# Patient Record
Sex: FEMALE | Race: OTHER | HISPANIC OR LATINO | Employment: PART TIME | URBAN - METROPOLITAN AREA
[De-identification: names, ages, dates, MRNs, and addresses within clinical notes are randomized per-mention and may not be internally consistent; named-entity substitution may affect disease eponyms.]

---

## 2020-11-12 ENCOUNTER — NURSE TRIAGE (OUTPATIENT)
Dept: ADMINISTRATIVE | Facility: CLINIC | Age: 55
End: 2020-11-12

## 2020-11-12 NOTE — TELEPHONE ENCOUNTER
OCC Rn Patient calling today, c/o Bleeding from the vagina, started today.  Pad x 3 saturated and dark brown  pads.  Patient is post menopause and now is bleeding.  Dark brown.   Pain is a 8/10 Vagina Pain. And ab pain.   Care advice is to go to ED now or to office with OBGYN approval,   Calling OB for pt, to see if she can see  Today,  Can go to walk in clinic to See Dr. Womack or associate, or need to go to ED//911. If symptoms get worse to call 911.   Patient  Verbalized understanding.        Reason for Disposition   SEVERE abdominal pain (e.g., excruciating)    Additional Information   Negative: Passed out (i.e., fainted, collapsed and was not responding)   Negative: Difficult to awaken or acting confused (e.g., disoriented, slurred speech)   Negative: Shock suspected (e.g., cold/pale/clammy skin, too weak to stand)   Negative: SEVERE bleeding (e.g., continuous red blood from vagina, or large blood clots) and very weak (can't stand)   Negative: Sounds like a life-threatening emergency to the triager   Negative: Pain or burning with passing urine is the main symptom   Negative: Pregnant with vaginal discharge    Protocols used: ST VAGINAL [id number]-IA, ST VAGINAL BLEEDING - [id number]

## 2022-07-05 PROBLEM — N39.0 UTI (URINARY TRACT INFECTION): Status: ACTIVE | Noted: 2022-07-05

## 2022-07-05 PROBLEM — E11.9 DIABETES: Status: ACTIVE | Noted: 2022-07-05

## 2022-07-05 PROBLEM — E78.5 HYPERLIPIDEMIA: Status: ACTIVE | Noted: 2022-07-05

## 2022-07-05 PROBLEM — J45.909 ASTHMA: Status: ACTIVE | Noted: 2022-07-05

## 2022-07-05 PROBLEM — E03.9 HYPOTHYROID: Status: ACTIVE | Noted: 2022-07-05

## 2022-07-05 PROBLEM — Z79.4 TYPE 2 DIABETES MELLITUS, WITH LONG-TERM CURRENT USE OF INSULIN: Status: ACTIVE | Noted: 2022-07-05

## 2022-08-07 PROBLEM — M89.8X8 SKULL MASS: Status: ACTIVE | Noted: 2022-08-07

## 2022-08-07 PROBLEM — I10 PRIMARY HYPERTENSION: Status: ACTIVE | Noted: 2022-08-07

## 2022-08-26 PROBLEM — M43.16 SPONDYLOLISTHESIS OF LUMBAR REGION: Status: ACTIVE | Noted: 2022-08-26

## 2022-11-03 PROBLEM — Z01.810 PRE-OPERATIVE CARDIOVASCULAR EXAMINATION: Status: ACTIVE | Noted: 2022-11-03

## 2022-11-03 PROBLEM — M48.062 SPINAL STENOSIS OF LUMBAR REGION WITH NEUROGENIC CLAUDICATION: Status: ACTIVE | Noted: 2022-11-03

## 2022-11-03 PROBLEM — Z86.73 HISTORY OF TIA (TRANSIENT ISCHEMIC ATTACK): Status: ACTIVE | Noted: 2022-11-03

## 2022-11-03 PROBLEM — Z98.61 CAD S/P PERCUTANEOUS CORONARY ANGIOPLASTY: Status: ACTIVE | Noted: 2022-11-03

## 2022-11-03 PROBLEM — Z01.818 PREOPERATIVE EVALUATION TO RULE OUT SURGICAL CONTRAINDICATION: Status: ACTIVE | Noted: 2022-11-03

## 2022-11-03 PROBLEM — I25.10 CAD S/P PERCUTANEOUS CORONARY ANGIOPLASTY: Status: ACTIVE | Noted: 2022-11-03

## 2022-11-04 PROBLEM — K59.00 CONSTIPATION: Status: ACTIVE | Noted: 2022-11-04

## 2022-11-06 PROBLEM — R50.9 FEVER: Status: ACTIVE | Noted: 2022-11-06

## 2022-12-09 PROBLEM — G06.1 ABSCESS IN EPIDURAL SPACE OF LUMBAR SPINE: Status: ACTIVE | Noted: 2022-12-09

## 2022-12-09 PROBLEM — R15.9 BOWEL AND BLADDER INCONTINENCE: Status: ACTIVE | Noted: 2022-12-09

## 2022-12-09 PROBLEM — D17.79 EPIDURAL LIPOMATOSIS: Status: ACTIVE | Noted: 2022-12-09

## 2022-12-09 PROBLEM — R32 BOWEL AND BLADDER INCONTINENCE: Status: ACTIVE | Noted: 2022-12-09

## 2022-12-09 PROBLEM — E66.01 CLASS 2 SEVERE OBESITY WITH SERIOUS COMORBIDITY AND BODY MASS INDEX (BMI) OF 38.0 TO 38.9 IN ADULT: Status: ACTIVE | Noted: 2022-12-09

## 2022-12-09 PROBLEM — R20.0 SENSORY LOSS: Status: ACTIVE | Noted: 2022-12-09

## 2022-12-09 PROBLEM — M54.50 ACUTE MIDLINE LOW BACK PAIN: Status: ACTIVE | Noted: 2022-12-09

## 2022-12-09 PROBLEM — E11.42 DIABETIC PERIPHERAL NEUROPATHY: Status: ACTIVE | Noted: 2022-12-09

## 2022-12-09 PROBLEM — R29.898 WEAKNESS OF BOTH LOWER EXTREMITIES: Status: ACTIVE | Noted: 2022-12-09

## 2022-12-09 PROBLEM — E66.812 CLASS 2 SEVERE OBESITY WITH SERIOUS COMORBIDITY AND BODY MASS INDEX (BMI) OF 38.0 TO 38.9 IN ADULT: Status: ACTIVE | Noted: 2022-12-09

## 2022-12-09 PROBLEM — L76.34 POSTOPERATIVE SEROMA OF SUBCUTANEOUS TISSUE AFTER NON-DERMATOLOGIC PROCEDURE: Status: ACTIVE | Noted: 2022-12-09

## 2022-12-12 PROBLEM — G06.1 ABSCESS IN EPIDURAL SPACE OF LUMBAR SPINE: Status: RESOLVED | Noted: 2022-12-09 | Resolved: 2022-12-12

## 2022-12-28 PROBLEM — E66.01 CLASS 2 SEVERE OBESITY WITH SERIOUS COMORBIDITY AND BODY MASS INDEX (BMI) OF 38.0 TO 38.9 IN ADULT: Chronic | Status: ACTIVE | Noted: 2022-12-09

## 2022-12-28 PROBLEM — E66.812 CLASS 2 SEVERE OBESITY WITH SERIOUS COMORBIDITY AND BODY MASS INDEX (BMI) OF 38.0 TO 38.9 IN ADULT: Chronic | Status: ACTIVE | Noted: 2022-12-09

## 2022-12-28 PROBLEM — M48.062 SPINAL STENOSIS OF LUMBAR REGION WITH NEUROGENIC CLAUDICATION: Chronic | Status: ACTIVE | Noted: 2022-11-03

## 2022-12-28 PROBLEM — R20.0 SENSORY LOSS: Chronic | Status: ACTIVE | Noted: 2022-12-09

## 2022-12-28 PROBLEM — Z79.4 INSULIN DEPENDENT TYPE 2 DIABETES MELLITUS: Chronic | Status: ACTIVE | Noted: 2022-07-05

## 2022-12-28 PROBLEM — R15.9 BOWEL AND BLADDER INCONTINENCE: Chronic | Status: ACTIVE | Noted: 2022-12-09

## 2022-12-28 PROBLEM — Z78.9 ACTIVITY OF DAILY LIVING ALTERATION: Status: ACTIVE | Noted: 2022-12-28

## 2022-12-28 PROBLEM — I25.10 CAD S/P PERCUTANEOUS CORONARY ANGIOPLASTY: Chronic | Status: ACTIVE | Noted: 2022-11-03

## 2022-12-28 PROBLEM — J45.909 ASTHMA: Chronic | Status: ACTIVE | Noted: 2022-07-05

## 2022-12-28 PROBLEM — R29.898 WEAKNESS OF BOTH LOWER EXTREMITIES: Chronic | Status: ACTIVE | Noted: 2022-12-09

## 2022-12-28 PROBLEM — I10 HYPERTENSION: Chronic | Status: ACTIVE | Noted: 2022-08-07

## 2022-12-28 PROBLEM — D17.79 EPIDURAL LIPOMATOSIS: Chronic | Status: ACTIVE | Noted: 2022-12-09

## 2022-12-28 PROBLEM — Z98.61 CAD S/P PERCUTANEOUS CORONARY ANGIOPLASTY: Chronic | Status: ACTIVE | Noted: 2022-11-03

## 2022-12-28 PROBLEM — E11.42 DIABETIC PERIPHERAL NEUROPATHY: Chronic | Status: ACTIVE | Noted: 2022-12-09

## 2022-12-28 PROBLEM — L76.34 POSTOPERATIVE SEROMA OF SUBCUTANEOUS TISSUE AFTER NON-DERMATOLOGIC PROCEDURE: Chronic | Status: ACTIVE | Noted: 2022-12-09

## 2022-12-28 PROBLEM — Z86.73 HISTORY OF TIA (TRANSIENT ISCHEMIC ATTACK): Chronic | Status: ACTIVE | Noted: 2022-11-03
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
## 2022-12-28 PROBLEM — E78.5 HYPERLIPIDEMIA: Chronic | Status: ACTIVE | Noted: 2022-07-05

## 2022-12-28 PROBLEM — E11.9 INSULIN DEPENDENT TYPE 2 DIABETES MELLITUS: Chronic | Status: ACTIVE | Noted: 2022-07-05

## 2022-12-28 PROBLEM — E03.9 HYPOTHYROIDISM: Chronic | Status: ACTIVE | Noted: 2022-07-05

## 2022-12-28 PROBLEM — M43.16 SPONDYLOLISTHESIS OF LUMBAR REGION: Chronic | Status: ACTIVE | Noted: 2022-08-26

## 2022-12-28 PROBLEM — M89.8X8 SKULL MASS: Chronic | Status: ACTIVE | Noted: 2022-08-07

## 2022-12-28 PROBLEM — R32 BOWEL AND BLADDER INCONTINENCE: Chronic | Status: ACTIVE | Noted: 2022-12-09

## 2023-01-17 PROBLEM — Z98.1 S/P LUMBAR FUSION: Status: ACTIVE | Noted: 2023-01-17

## 2023-03-16 PROBLEM — H25.13 AGE-RELATED NUCLEAR CATARACT OF BOTH EYES: Status: ACTIVE | Noted: 2023-03-16
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
## 2023-05-27 PROBLEM — I10 HYPERTENSION: Status: ACTIVE | Noted: 2023-05-27

## 2023-05-27 PROBLEM — E11.9 DIABETES MELLITUS: Status: ACTIVE | Noted: 2023-05-27

## 2023-05-27 PROBLEM — M54.50 ACUTE MIDLINE LOW BACK PAIN: Status: ACTIVE | Noted: 2023-05-27

## 2023-05-27 PROBLEM — G35 MS (MULTIPLE SCLEROSIS): Status: ACTIVE | Noted: 2023-05-27

## 2023-05-27 PROBLEM — G45.9 TIA (TRANSIENT ISCHEMIC ATTACK): Status: ACTIVE | Noted: 2023-05-27

## 2023-05-28 PROBLEM — E11.9 DIABETES MELLITUS: Chronic | Status: ACTIVE | Noted: 2023-05-27

## 2023-05-28 PROBLEM — R29.90 NEUROSENSORY DEFICIT: Status: ACTIVE | Noted: 2023-05-28

## 2023-05-28 PROBLEM — I10 HYPERTENSION: Chronic | Status: ACTIVE | Noted: 2023-05-27

## 2023-05-28 PROBLEM — G35 MS (MULTIPLE SCLEROSIS): Chronic | Status: ACTIVE | Noted: 2023-05-27

## 2023-05-28 PROBLEM — E03.8 OTHER SPECIFIED HYPOTHYROIDISM: Status: ACTIVE | Noted: 2023-05-28

## 2023-05-30 PROBLEM — R29.898 BILATERAL LEG WEAKNESS: Status: ACTIVE | Noted: 2023-05-30

## 2023-08-28 PROBLEM — G45.9 TIA (TRANSIENT ISCHEMIC ATTACK): Status: RESOLVED | Noted: 2023-05-27 | Resolved: 2023-08-28

## 2024-04-19 PROBLEM — H25.12 AGE-RELATED NUCLEAR CATARACT OF LEFT EYE: Status: ACTIVE | Noted: 2023-03-16

## 2024-05-21 PROBLEM — H25.811 COMBINED FORMS OF AGE-RELATED CATARACT OF RIGHT EYE: Status: ACTIVE | Noted: 2024-05-21

## 2024-07-29 PROBLEM — E66.812 CLASS 2 OBESITY WITH BODY MASS INDEX (BMI) OF 37.0 TO 37.9 IN ADULT: Status: ACTIVE | Noted: 2024-07-29

## 2024-07-29 PROBLEM — F41.9 ANXIETY: Status: ACTIVE | Noted: 2024-07-29

## 2024-07-29 PROBLEM — R60.0 LEG EDEMA, LEFT: Status: ACTIVE | Noted: 2024-07-29

## 2024-07-29 PROBLEM — G43.909 MIGRAINE: Status: ACTIVE | Noted: 2024-07-29

## 2024-07-29 PROBLEM — R10.9 ABDOMINAL PAIN: Status: ACTIVE | Noted: 2024-07-29

## 2024-07-29 PROBLEM — J96.01 ACUTE HYPOXEMIC RESPIRATORY FAILURE: Status: ACTIVE | Noted: 2024-07-29

## 2024-08-02 PROBLEM — B37.0 THRUSH, ORAL: Status: ACTIVE | Noted: 2024-08-02

## 2024-08-02 PROBLEM — G35 MULTIPLE SCLEROSIS: Status: ACTIVE | Noted: 2024-08-02

## 2024-09-26 PROBLEM — H02.834 DERMATOCHALASIS OF BOTH UPPER EYELIDS: Status: ACTIVE | Noted: 2024-09-26

## 2024-09-26 PROBLEM — H02.423 MYOGENIC PTOSIS OF BILATERAL EYELIDS: Status: ACTIVE | Noted: 2024-09-26

## 2024-09-26 PROBLEM — H02.831 DERMATOCHALASIS OF BOTH UPPER EYELIDS: Status: ACTIVE | Noted: 2024-09-26

## 2024-09-26 PROBLEM — H57.812 BROW PTOSIS, LEFT: Status: ACTIVE | Noted: 2024-09-26

## 2024-10-28 PROBLEM — J96.01 ACUTE HYPOXEMIC RESPIRATORY FAILURE: Status: RESOLVED | Noted: 2024-07-29 | Resolved: 2024-10-28

## 2024-10-28 PROBLEM — G45.9 TIA (TRANSIENT ISCHEMIC ATTACK): Status: RESOLVED | Noted: 2023-05-27 | Resolved: 2024-10-28

## 2025-04-09 DIAGNOSIS — F17.200 NEEDS SMOKING CESSATION EDUCATION: ICD-10-CM
